# Patient Record
Sex: FEMALE | Race: ASIAN | NOT HISPANIC OR LATINO | URBAN - METROPOLITAN AREA
[De-identification: names, ages, dates, MRNs, and addresses within clinical notes are randomized per-mention and may not be internally consistent; named-entity substitution may affect disease eponyms.]

---
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
## 2024-05-21 NOTE — ED BEHAVIORAL HEALTH ASSESSMENT NOTE - GAIT / STATION
Refill requested:       Last office visit: 02/15/2024 For: diabetes follow up   PCP: Dr. Diop     Upcoming appt: Visit date not found    Medication refilled per protocol.    Patient is coming due for a follow up with their PCP around August 15th. Please call patient and have them schedule. Thank you.    Normal gait / station

## 2024-08-14 NOTE — ED BEHAVIORAL HEALTH ASSESSMENT NOTE - DESCRIPTION (FIRST USE, LAST USE, QUANTITY, FREQUENCY, DURATION)
FAMILY HISTORY:  Sibling  Still living? Yes, Estimated age: Age Unknown  Family history of heart disease, Age at diagnosis: Age Unknown    Grandparent  Still living? Unknown  Family history of diabetes mellitus, Age at diagnosis: Age Unknown    Aunt  Still living? No  Family history of diabetes mellitus, Age at diagnosis: Age Unknown    
patient reports she has taken Xanax provided by friends for anxiety